# Patient Record
Sex: MALE | Race: BLACK OR AFRICAN AMERICAN | Employment: FULL TIME | URBAN - METROPOLITAN AREA
[De-identification: names, ages, dates, MRNs, and addresses within clinical notes are randomized per-mention and may not be internally consistent; named-entity substitution may affect disease eponyms.]

---

## 2018-03-20 ENCOUNTER — OFFICE VISIT (OUTPATIENT)
Dept: INTERNAL MEDICINE CLINIC | Age: 33
End: 2018-03-20

## 2018-03-20 VITALS
OXYGEN SATURATION: 99 % | TEMPERATURE: 99 F | DIASTOLIC BLOOD PRESSURE: 88 MMHG | BODY MASS INDEX: 32.73 KG/M2 | SYSTOLIC BLOOD PRESSURE: 136 MMHG | HEIGHT: 75 IN | HEART RATE: 89 BPM | WEIGHT: 263.2 LBS

## 2018-03-20 DIAGNOSIS — I10 ESSENTIAL HYPERTENSION: Primary | ICD-10-CM

## 2018-03-20 DIAGNOSIS — Z09 HOSPITAL DISCHARGE FOLLOW-UP: ICD-10-CM

## 2018-03-20 PROCEDURE — G8427 DOCREV CUR MEDS BY ELIG CLIN: HCPCS | Performed by: NURSE PRACTITIONER

## 2018-03-20 PROCEDURE — 99203 OFFICE O/P NEW LOW 30 MIN: CPT | Performed by: NURSE PRACTITIONER

## 2018-03-20 PROCEDURE — 4004F PT TOBACCO SCREEN RCVD TLK: CPT | Performed by: NURSE PRACTITIONER

## 2018-03-20 PROCEDURE — G8417 CALC BMI ABV UP PARAM F/U: HCPCS | Performed by: NURSE PRACTITIONER

## 2018-03-20 PROCEDURE — G8484 FLU IMMUNIZE NO ADMIN: HCPCS | Performed by: NURSE PRACTITIONER

## 2018-03-20 ASSESSMENT — ENCOUNTER SYMPTOMS: SHORTNESS OF BREATH: 0

## 2018-10-29 ENCOUNTER — HOSPITAL ENCOUNTER (EMERGENCY)
Age: 33
Discharge: HOME | End: 2018-10-29
Payer: MEDICAID

## 2018-10-29 VITALS
RESPIRATION RATE: 16 BRPM | WEIGHT: 260.8 LBS | HEIGHT: 74 IN | SYSTOLIC BLOOD PRESSURE: 145 MMHG | HEART RATE: 85 BPM | BODY MASS INDEX: 33.47 KG/M2 | OXYGEN SATURATION: 100 % | DIASTOLIC BLOOD PRESSURE: 96 MMHG | TEMPERATURE: 98.7 F

## 2018-10-29 DIAGNOSIS — R36.9 PENILE DISCHARGE: ICD-10-CM

## 2018-10-29 DIAGNOSIS — R03.0 ELEVATED BLOOD PRESSURE READING: ICD-10-CM

## 2018-10-29 DIAGNOSIS — R30.0 DYSURIA: Primary | ICD-10-CM

## 2018-10-29 DIAGNOSIS — Z11.3 SCREENING EXAMINATION FOR SEXUALLY TRANSMITTED DISEASE: ICD-10-CM

## 2018-10-29 LAB
BILIRUBIN URINE: NEGATIVE
BLOOD, URINE: ABNORMAL
CLARITY: ABNORMAL
COLOR: ABNORMAL
EPITHELIAL CELLS, UA: 0 /HPF (ref 0–5)
GLUCOSE URINE: NEGATIVE MG/DL
HYALINE CASTS: 1 /LPF (ref 0–8)
KETONES, URINE: NEGATIVE MG/DL
LEUKOCYTE ESTERASE, URINE: ABNORMAL
MICROSCOPIC EXAMINATION: YES
NITRITE, URINE: NEGATIVE
PH UA: 5.5
PROTEIN UA: 30 MG/DL
RBC UA: 48 /HPF (ref 0–4)
SPECIFIC GRAVITY UA: 1.02
URINE REFLEX TO CULTURE: YES
URINE TRICHOMONAS EVALUATION: NORMAL
URINE TYPE: ABNORMAL
UROBILINOGEN, URINE: 1 E.U./DL
WBC UA: 460 /HPF (ref 0–5)

## 2018-10-29 PROCEDURE — 87491 CHLMYD TRACH DNA AMP PROBE: CPT

## 2018-10-29 PROCEDURE — 87591 N.GONORRHOEAE DNA AMP PROB: CPT

## 2018-10-29 PROCEDURE — 96372 THER/PROPH/DIAG INJ SC/IM: CPT

## 2018-10-29 PROCEDURE — 99283 EMERGENCY DEPT VISIT LOW MDM: CPT

## 2018-10-29 PROCEDURE — 87086 URINE CULTURE/COLONY COUNT: CPT

## 2018-10-29 PROCEDURE — 81001 URINALYSIS AUTO W/SCOPE: CPT

## 2018-10-29 PROCEDURE — 6370000000 HC RX 637 (ALT 250 FOR IP): Performed by: PHYSICIAN ASSISTANT

## 2018-10-29 PROCEDURE — 6360000002 HC RX W HCPCS: Performed by: PHYSICIAN ASSISTANT

## 2018-10-29 RX ORDER — CEFTRIAXONE SODIUM 250 MG/1
250 INJECTION, POWDER, FOR SOLUTION INTRAMUSCULAR; INTRAVENOUS ONCE
Status: COMPLETED | OUTPATIENT
Start: 2018-10-29 | End: 2018-10-29

## 2018-10-29 RX ORDER — AZITHROMYCIN 500 MG/1
1000 TABLET, FILM COATED ORAL ONCE
Status: COMPLETED | OUTPATIENT
Start: 2018-10-29 | End: 2018-10-29

## 2018-10-29 RX ADMIN — AZITHROMYCIN 1000 MG: 500 TABLET, FILM COATED ORAL at 11:10

## 2018-10-29 RX ADMIN — CEFTRIAXONE SODIUM 250 MG: 250 INJECTION, POWDER, FOR SOLUTION INTRAMUSCULAR; INTRAVENOUS at 11:11

## 2018-10-29 ASSESSMENT — PAIN SCALES - GENERAL
PAINLEVEL_OUTOF10: 0
PAINLEVEL_OUTOF10: 0

## 2018-10-29 ASSESSMENT — PAIN SCALES - WONG BAKER: WONGBAKER_NUMERICALRESPONSE: 0

## 2018-10-30 LAB — URINE CULTURE, ROUTINE: NORMAL

## 2018-10-30 ASSESSMENT — ENCOUNTER SYMPTOMS
VOMITING: 0
NAUSEA: 0
ABDOMINAL PAIN: 0

## 2018-10-30 NOTE — ED PROVIDER NOTES
[facility]  eMERGENCY dEPARTMENT eNCOUnter      Evaluation by Advanced Practice Provider    Pt Name: Vince Huang  MRN: [id number]  [place] 1985  Date of evaluation: 10/29/2018  Provider: Terrell Rausch, [address] [place] [address]       Chief Complaint   Patient presents with    Dysuria     burning with urination and penile discharge- concerned for STD    Exposure to STD         HISTORY OF PRESENT ILLNESS  (Location/Symptom, Timing/Onset, Context/Setting, Quality, Duration,Modifying Factors, Severity.)   Vince Huang is a 29 y.o. male who presents to the emergencydepartment For dysuria and penile discharge since Thursday. He is concerned about STDs. No known exposure. Denies testicular pain. Denies sores lesions or rash. Denies fever chills nausea vomiting abdominal pain. Nursing Notes were reviewed and I agree. REVIEW OF SYSTEMS    (2-9 systems for level 4, 10 or more for level 5)     Review of Systems   Constitutional: Negative for chills and fever. Gastrointestinal: Negative for abdominal pain, nausea and vomiting. Genitourinary: Positive for discharge and dysuria. Negative for testicular pain. Skin: Negative for rash. Except as noted above the remainder of the review of systems was reviewed and negative. PAST MEDICAL HISTORY         Diagnosis Date    STD (male)        SURGICAL HISTORY   History reviewed. No pertinent surgical history. CURRENT MEDICATIONS       Discharge Medication List as of 10/29/2018 11:33 AM      CONTINUE these medications which have NOT CHANGED    Details   lisinopril (PRINIVIL) 10 MG tablet Take 1 tablet by mouth daily, Disp-30 tablet, R-3Print             ALLERGIES     Patient has no known allergies.     FAMILY HISTORY       Family History   Problem Relation Age of Onset    High Blood Pressure Mother     High Blood Pressure Father     High Blood Pressure Maternal Grandmother      Family Status   Relation

## 2018-10-31 LAB
C. TRACHOMATIS DNA ,URINE: NEGATIVE
N. GONORRHOEAE DNA, URINE: POSITIVE

## 2021-01-22 ENCOUNTER — HOSPITAL ENCOUNTER (EMERGENCY)
Age: 36
Discharge: HOME | End: 2021-01-22

## 2021-01-22 VITALS
DIASTOLIC BLOOD PRESSURE: 92 MMHG | TEMPERATURE: 98.5 F | BODY MASS INDEX: 33.1 KG/M2 | SYSTOLIC BLOOD PRESSURE: 156 MMHG | HEIGHT: 74 IN | RESPIRATION RATE: 16 BRPM | WEIGHT: 257.94 LBS | HEART RATE: 106 BPM | OXYGEN SATURATION: 97 %

## 2021-01-22 DIAGNOSIS — I10 POOR HIGH BLOOD PRESSURE CONTROL: ICD-10-CM

## 2021-01-22 DIAGNOSIS — N34.2 URETHRITIS: ICD-10-CM

## 2021-01-22 DIAGNOSIS — Z71.1 CONCERN ABOUT STD IN MALE WITHOUT DIAGNOSIS: Primary | ICD-10-CM

## 2021-01-22 LAB
BILIRUBIN URINE: NEGATIVE
BLOOD, URINE: NEGATIVE
CLARITY: ABNORMAL
COLOR: ABNORMAL
EPITHELIAL CELLS, UA: 0 /HPF (ref 0–5)
GLUCOSE URINE: 100 MG/DL
HYALINE CASTS: 2 /LPF (ref 0–8)
KETONES, URINE: ABNORMAL MG/DL
LEUKOCYTE ESTERASE, URINE: ABNORMAL
MICROSCOPIC EXAMINATION: YES
NITRITE, URINE: NEGATIVE
PH UA: 6 (ref 5–8)
PROTEIN UA: 30 MG/DL
RBC UA: 3 /HPF (ref 0–4)
SPECIFIC GRAVITY UA: 1.03 (ref 1–1.03)
SPECIMEN TYPE: NORMAL
TRICHOMONAS VAGINALIS SCREEN: NEGATIVE
URINE REFLEX TO CULTURE: YES
URINE TYPE: ABNORMAL
UROBILINOGEN, URINE: 1 E.U./DL
WBC UA: 366 /HPF (ref 0–5)

## 2021-01-22 PROCEDURE — 87086 URINE CULTURE/COLONY COUNT: CPT

## 2021-01-22 PROCEDURE — 96372 THER/PROPH/DIAG INJ SC/IM: CPT

## 2021-01-22 PROCEDURE — 6370000000 HC RX 637 (ALT 250 FOR IP): Performed by: GENERAL ACUTE CARE HOSPITAL

## 2021-01-22 PROCEDURE — 99283 EMERGENCY DEPT VISIT LOW MDM: CPT

## 2021-01-22 PROCEDURE — 87808 TRICHOMONAS ASSAY W/OPTIC: CPT

## 2021-01-22 PROCEDURE — 81001 URINALYSIS AUTO W/SCOPE: CPT

## 2021-01-22 PROCEDURE — 6360000002 HC RX W HCPCS: Performed by: GENERAL ACUTE CARE HOSPITAL

## 2021-01-22 RX ORDER — DOXYCYCLINE HYCLATE 100 MG
100 TABLET ORAL ONCE
Status: COMPLETED | OUTPATIENT
Start: 2021-01-22 | End: 2021-01-22

## 2021-01-22 RX ORDER — DOXYCYCLINE 100 MG/1
100 CAPSULE ORAL 2 TIMES DAILY
Qty: 14 CAPSULE | Refills: 0 | Status: SHIPPED | OUTPATIENT
Start: 2021-01-22 | End: 2021-01-29

## 2021-01-22 RX ORDER — CEFTRIAXONE 1 G/1
500 INJECTION, POWDER, FOR SOLUTION INTRAMUSCULAR; INTRAVENOUS ONCE
Status: COMPLETED | OUTPATIENT
Start: 2021-01-22 | End: 2021-01-22

## 2021-01-22 RX ADMIN — CEFTRIAXONE 500 MG: 1 INJECTION, POWDER, FOR SOLUTION INTRAMUSCULAR; INTRAVENOUS at 10:40

## 2021-01-22 RX ADMIN — DOXYCYCLINE HYCLATE 100 MG: 100 TABLET, COATED ORAL at 10:40

## 2021-01-22 ASSESSMENT — ENCOUNTER SYMPTOMS
BACK PAIN: 0
CHEST TIGHTNESS: 0
VOMITING: 0
ABDOMINAL PAIN: 0
SORE THROAT: 0
SHORTNESS OF BREATH: 0
NAUSEA: 0
ALLERGIC/IMMUNOLOGIC NEGATIVE: 1

## 2021-01-22 NOTE — ED NOTES
Pt here with CC of being exposed to a \"UTI\" and needing [address] for an STD. Urine collected and sent to lab for analysis.        Vishal Agrawal RN  01/22/21 101

## 2021-01-22 NOTE — ED PROVIDER NOTES
[address] [facility]        Pt Name: Param Yanez  MRN: [id number]  [place] 1985  Date of evaluation: 1/22/2021  Provider: MARIBEL Flores CNP  PCP: No primary care provider on file. LISET. I have evaluated this patient. My supervising physician was available for consultation. CHIEF COMPLAINT       Chief Complaint   Patient presents with    Exposure to STD     Patient states he has had tingling and penile discharge. Unsure if he has been exposed to STD       HISTORY OF PRESENT ILLNESS   (Location, Timing/Onset, Context/Setting, Quality, Duration, Modifying Factors, Severity, Associated Signs and Symptoms)  Note limiting factors. Param Yanez is a 37 y.o. male nursing notes reviewed. This is a 82-year-old male who presents to the emergency department today requesting STD check. He reports approximate 1 week history of dysuria and penile discharge. He denies hematuria. He states that he has had STDs in the past and today symptoms feel similar. He denies having any genital rash or lesions. He denies having any abdominal pain or back pain. There is been no vomiting. He denies recent travel or known sick contacts. There has been no fever, chills, or other symptoms. Nursing Notes were all reviewed and agreed with or any disagreements were addressed in the HPI. REVIEW OF SYSTEMS    (2-9 systems for level 4, 10 or more for level 5)     Review of Systems   Constitutional: Negative for chills and fever. HENT: Negative for congestion and sore throat. Respiratory: Negative for chest tightness and shortness of breath. Gastrointestinal: Negative for abdominal pain, nausea and vomiting. Genitourinary: Positive for discharge and dysuria. Negative for hematuria, penile swelling, scrotal swelling and testicular pain. Musculoskeletal: Negative for back pain and neck pain. Skin: Negative for rash. Allergic/Immunologic: Negative. Neurological: Negative. Hematological: Negative. Psychiatric/Behavioral: Negative for suicidal ideas. Positives and Pertinent negatives as per HPI. Except as noted above in the ROS, all other systems were reviewed and negative. PAST MEDICAL HISTORY     Past Medical History:   Diagnosis Date    STD (male)          SURGICAL HISTORY   History reviewed. No pertinent surgical history. [address]       Discharge Medication List as of 1/22/2021 11:11 AM      CONTINUE these medications which have NOT CHANGED    Details   lisinopril (PRINIVIL) 10 MG tablet Take 1 tablet by mouth daily, Disp-30 tablet, R-3Print               ALLERGIES     Patient has no known allergies. FAMILYHISTORY       Family History   Problem Relation Age of Onset    High Blood Pressure Mother     High Blood Pressure Father     High Blood Pressure Maternal Grandmother           SOCIAL HISTORY       Social History     Tobacco Use    Smoking status: Current Some Day Smoker     Packs/day: 0.10     Types: Cigarettes    Smokeless tobacco: Never Used   Substance Use Topics    Alcohol use: Yes     Alcohol/week: 105.0 standard drinks     Types: 105 Shots of liquor per week    Drug use: No       SCREENINGS             PHYSICAL EXAM    (up to 7 for level 4, 8 or more for level 5)     ED Triage Vitals [01/22/21 0812]   BP Temp Temp Source Pulse Resp SpO2 Height Weight   (!) 156/92 98.5 °F (36.9 °C) Oral 106 16 97 % 6' 2\" (1.88 m) 257 lb 15 oz (117 kg)       Physical Exam  Vitals signs and nursing note reviewed. Constitutional:       Appearance: Normal appearance. HENT:      Head: Normocephalic and atraumatic. Right Ear: External ear normal.      Left Ear: External ear normal.      Nose: Nose normal.      Mouth/Throat:      Mouth: Mucous membranes are moist.   Eyes:      General:         Right eye: No discharge. Left eye: No discharge.       Extraocular Movements: Extraocular movements intact. Neck:      Musculoskeletal: Normal range of motion and neck supple. Cardiovascular:      Rate and Rhythm: Normal rate and regular rhythm. Pulses: Normal pulses. Heart sounds: Normal heart sounds. Pulmonary:      Effort: Pulmonary effort is normal. No respiratory distress. Breath sounds: Normal breath sounds. Abdominal:      Palpations: Abdomen is soft. Genitourinary:     Comments: Deferred, patient denies having any genital lesions  Skin:     General: Skin is warm and dry. Capillary Refill: Capillary refill takes less than 2 seconds. Neurological:      General: No focal deficit present. Mental Status: He is alert and oriented to person, place, and time. Psychiatric:         Mood and Affect: Mood normal.         Behavior: Behavior normal.         DIAGNOSTIC RESULTS   LABS:    Labs Reviewed   URINE RT REFLEX TO CULTURE - Abnormal; Notable for the following components:       Result Value    Clarity, UA CLOUDY (*)     Glucose, Ur 100 (*)     Ketones, Urine TRACE (*)     Protein, UA 30 (*)     Leukocyte Esterase, Urine MODERATE (*)     All other components within normal limits    Narrative:     Performed at:  [facility]  [address] [place], [place]   Phone [phone]   MICROSCOPIC URINALYSIS - Abnormal; Notable for the following components:    WBC,  (*)     All other components within normal limits    Narrative:     Performed at:  [facility]  [address] [facility] [place] [address]   Phone [phone]   C. TRACHOMATIS / N. GONORRHOEAE, DNA   CULTURE, URINE   TRICHOMONAS BY EIA    Narrative:     Performed at:  [facility]  [address] [facility] [address]   Phone [phone]       All other labs were within normal range or not returned as of this dictation. EKG:  All EKG's are interpreted by the Emergency Department Physician in the absence of a cardiologist.  Please see their note for interpretation of EKG. RADIOLOGY:   Non-plain film images such as CT, Ultrasound and MRI are read by the radiologist. Plain radiographic images are visualized and preliminarily interpreted by the ED Provider with the below findings:        Interpretation per the Radiologist below, if available at the time of this note:    No orders to display     No results found. PROCEDURES   Unless otherwise noted below, none     Procedures    CRITICAL CARE TIME   N/A    CONSULTS:  None      EMERGENCY DEPARTMENT COURSE and DIFFERENTIAL DIAGNOSIS/MDM:   Vitals:    Vitals:    01/22/21 0812   BP: (!) 156/92   Pulse: 106   Resp: 16   Temp: 98.5 °F (36.9 °C)   TempSrc: Oral   SpO2: 97%   Weight: 257 lb 15 oz (117 kg)   Height: 6' 2\" (1.88 m)       Patient was given the following medications:  Medications   cefTRIAXone (ROCEPHIN) injection 500 mg (500 mg Intramuscular Given 1/22/21 1040)   doxycycline hyclate (VIBRA-TABS) tablet 100 mg (100 mg Oral Given 1/22/21 1040)           Nursing notes reviewed. This is a 68-year-old -American male who presents to the emergency department today reporting concerns for STD. He reports 1 week history of penile discharge and dysuria. Physical exam complete. Patient is nontoxic, afebrile, mildly hypertensive. Patient denies known history of hypertension. He is counseled on the importance of good blood pressure control. Urinalysis shows large leukocytes. No trichomonas. GC chlamydia testing pending. Patient does request empiric STD treatment. He is medicated with IM Rocephin 500 mg and oral doxycycline. Patient is counseled on the importance of completing the entire dose of his antibiotics. He is aware that he must notify any sexual partners so that they may also be treated. PCP referral provided. He agrees to follow-up as directed.   He agrees return to nearest ED for high fever, incessant vomiting, severe pain, any other worsening symptoms. FINAL IMPRESSION      1. Concern about STD in male without diagnosis    2. Urethritis    3.  Poor high blood pressure control          DISPOSITION/PLAN   DISPOSITION Decision To Discharge 01/22/2021 08:55:50 AM      PATIENT REFERREDTO:  [facility]  [phone]    to establish primary care      DISCHARGE MEDICATIONS:  Discharge Medication List as of 1/22/2021 11:11 AM      START taking these medications    Details   doxycycline monohydrate (MONODOX) 100 MG capsule Take 1 capsule by mouth 2 times daily for 7 days May substitute any form of Doxycycline according to insurance guidelines or minimal cost to patient, Disp-14 capsule, R-0Normal             DISCONTINUED MEDICATIONS:  Discharge Medication List as of 1/22/2021 11:11 AM                 (Please note that portions of this note were completed with a voice recognition program.  Efforts were made to edit the dictations but occasionally words are mis-transcribed.)    MARIBEL Brooks CNP (electronically signed)           MARIBEL Brooks CNP  01/22/21 7779

## 2021-01-22 NOTE — ED NOTES
Patient states he has had tingling and penile discharge.  Unsure if he has been exposed to STD     Delphine Walker RN  01/22/21 0658

## 2021-01-22 NOTE — DISCHARGE INSTR - COC
Continuity of Care Form    Patient Name: Tiffanie Diamond   :  1985  MRN:  [id number]    Admit date:  2021  Discharge date:  ***    Code Status Order: No Order   Advance Directives:     Admitting Physician:  No admitting provider for patient encounter. PCP: No primary care provider on file. Discharging Nurse: [facility] Unit/Room#: 031/B-31  Discharging Unit Phone Number: ***    Emergency Contact:   Extended Emergency Contact Information  Primary Emergency Contact: Umm Guzman  Address: [address] [place] [address], [address] [place] [address],[address] Phone: [phone]  Relation: Parent  Secondary Emergency Contact: Colby Tolliver  Address: [address] Phone: [phone]  Relation: Parent    Past Surgical History:  History reviewed. No pertinent surgical history. Immunization History: There is no immunization history on file for this patient. Active Problems: There is no problem list on file for this patient.       Isolation/Infection:   Isolation          No Isolation        Patient Infection Status     None to display          Nurse Assessment:  Last Vital Signs: BP (!) 156/92   Pulse 106   Temp 98.5 °F (36.9 °C) (Oral)   Resp 16   Ht 6' 2\" (1.88 m)   Wt 257 lb 15 oz (117 kg)   SpO2 97%   BMI 33.12 kg/m²     Last documented pain score (0-10 scale):    Last Weight:   Wt Readings from Last 1 Encounters:   21 257 lb 15 oz (117 kg)     Mental Status:  {IP PT MENTAL STATUS:}    IV Access:  { MACEY IV ACCESS:519629068}    Nursing Mobility/ADLs:  Walking   {CHP DME PIBM:347291980}  Transfer  {CHP DME ELHK:133762641}  Bathing  {CHP DME GFKN:796813732}  Dressing  {CHP DME REHA:169864275}  Toileting  {CHP DME QKZL:557984438}  Feeding  {CHP DME YUXZ:721195047}  Med Admin  {P DME CHAA:063922122}  Med Delivery   { MACEY MED Delivery:813431763}    Wound Care Documentation and Therapy: Elimination:  Continence:   · Bowel: {YES / EJ:86124}  · Bladder: {YES / MX:53531}  Urinary Catheter: {Urinary Catheter:648147293}   Colostomy/Ileostomy/Ileal Conduit: {YES / WT:28076}       Date of Last BM: ***  No intake or output data in the 24 hours ending 21 1019  No intake/output data recorded.     Safety Concerns:     [facility] Safety Concerns:844238479}    Impairments/Disabilities:      [facility] Impairments/Disabilities:684217439}    Nutrition Therapy:  Current Nutrition Therapy:   [facility] Diet List:573197202}    Routes of Feeding: {CHP DME Other Feedings:984469620}  Liquids: {Slp liquid thickness:91066}  Daily Fluid Restriction: {CHP DME Yes amt example:388594011}  Last Modified Barium Swallow with Video (Video Swallowing Test): {Done Not Done HWIT:898012001}    Treatments at the Time of Hospital Discharge:   Respiratory Treatments: ***  Oxygen Therapy:  {Therapy; copd oxygen:80798}  Ventilator:    { CC Vent YRPX:252815058}    Rehab Therapies: {THERAPEUTIC INTERVENTION:9483905812}  Weight Bearing Status/Restrictions: [facility] Weight Bearin}  Other Medical Equipment (for information only, NOT a DME order):  {EQUIPMENT:464474698}  Other Treatments: ***    Patient's personal belongings (please select all that are sent with patient):  {[facility] DME Belongings:507372222}    RN SIGNATURE:  {Esignature:375200730}    CASE MANAGEMENT/SOCIAL WORK SECTION    Inpatient Status Date: ***    Readmission Risk Assessment Score:  Readmission Risk              Risk of Unplanned Readmission:        0           Discharging to Facility/ Agency   · Name:   · Address:  · Phone:  · Fax:    Dialysis Facility (if applicable)   · Name:  · Address:  · Dialysis Schedule:  · Phone:  · Fax:    / signature: {Esignature:678193782}    PHYSICIAN SECTION    Prognosis: {Prognosis:9164657179}    Condition at Discharge: [facility] Patient Condition:280550671}    Rehab Potential (if transferring to Rehab): {Prognosis:8028439056}    Recommended Labs or Other Treatments After Discharge: ***    Physician Certification: I certify the above information and transfer of Kelby Moody  is necessary for the continuing treatment of the diagnosis listed and that he requires {Admit to Appropriate Level of Care:41205} for {GREATER/LESS:969922132} 30 days.      Update Admission H&P: {CHP DME Changes in TQNNB:397755562}    PHYSICIAN SIGNATURE:  {Esignature:134121077}

## 2021-01-23 LAB — URINE CULTURE, ROUTINE: NORMAL

## 2021-09-26 ENCOUNTER — HOSPITAL ENCOUNTER (EMERGENCY)
Age: 36
Discharge: HOME | End: 2021-09-26
Attending: EMERGENCY MEDICINE
Payer: MEDICAID

## 2021-09-26 VITALS
WEIGHT: 252.43 LBS | OXYGEN SATURATION: 100 % | HEIGHT: 74 IN | SYSTOLIC BLOOD PRESSURE: 149 MMHG | HEART RATE: 97 BPM | TEMPERATURE: 98.2 F | BODY MASS INDEX: 32.4 KG/M2 | RESPIRATION RATE: 16 BRPM | DIASTOLIC BLOOD PRESSURE: 93 MMHG

## 2021-09-26 DIAGNOSIS — S39.012A STRAIN OF LUMBAR REGION, INITIAL ENCOUNTER: Primary | ICD-10-CM

## 2021-09-26 PROCEDURE — 96372 THER/PROPH/DIAG INJ SC/IM: CPT

## 2021-09-26 PROCEDURE — 6360000002 HC RX W HCPCS: Performed by: EMERGENCY MEDICINE

## 2021-09-26 PROCEDURE — 99283 EMERGENCY DEPT VISIT LOW MDM: CPT

## 2021-09-26 PROCEDURE — 6370000000 HC RX 637 (ALT 250 FOR IP): Performed by: EMERGENCY MEDICINE

## 2021-09-26 RX ORDER — LIDOCAINE 4 G/G
1 PATCH TOPICAL DAILY
Qty: 30 PATCH | Refills: 0 | Status: SHIPPED | OUTPATIENT
Start: 2021-09-26 | End: 2021-10-26

## 2021-09-26 RX ORDER — KETOROLAC TROMETHAMINE 15 MG/ML
15 INJECTION, SOLUTION INTRAMUSCULAR; INTRAVENOUS ONCE
Status: COMPLETED | OUTPATIENT
Start: 2021-09-26 | End: 2021-09-26

## 2021-09-26 RX ORDER — NAPROXEN 500 MG/1
500 TABLET ORAL 2 TIMES DAILY WITH MEALS
Qty: 60 TABLET | Refills: 5 | Status: SHIPPED | OUTPATIENT
Start: 2021-09-26

## 2021-09-26 RX ORDER — TIZANIDINE 4 MG/1
4 TABLET ORAL EVERY 6 HOURS PRN
Qty: 20 TABLET | Refills: 0 | Status: SHIPPED | OUTPATIENT
Start: 2021-09-26

## 2021-09-26 RX ORDER — LIDOCAINE 4 G/G
1 PATCH TOPICAL ONCE
Status: DISCONTINUED | OUTPATIENT
Start: 2021-09-26 | End: 2021-09-27 | Stop reason: HOSPADM

## 2021-09-26 RX ADMIN — KETOROLAC TROMETHAMINE 15 MG: 15 INJECTION, SOLUTION INTRAMUSCULAR; INTRAVENOUS at 22:47

## 2021-09-26 ASSESSMENT — ENCOUNTER SYMPTOMS
VOMITING: 0
SHORTNESS OF BREATH: 0
PHOTOPHOBIA: 0
COLOR CHANGE: 0
WHEEZING: 0
BACK PAIN: 0
RHINORRHEA: 0
NAUSEA: 0

## 2021-09-26 ASSESSMENT — PAIN DESCRIPTION - PAIN TYPE: TYPE: ACUTE PAIN

## 2021-09-26 ASSESSMENT — PAIN DESCRIPTION - FREQUENCY: FREQUENCY: OTHER (COMMENT)

## 2021-09-26 ASSESSMENT — PAIN SCALES - GENERAL: PAINLEVEL_OUTOF10: 6

## 2021-09-26 ASSESSMENT — PAIN DESCRIPTION - LOCATION: LOCATION: BACK

## 2021-09-26 ASSESSMENT — PAIN DESCRIPTION - DESCRIPTORS: DESCRIPTORS: DISCOMFORT

## 2021-09-27 NOTE — ED NOTES
D/C: Order noted for d/c. Pt confirmed d/c paperwork and RX have correct name. Discharge and education instructions reviewed with patient. Teach-back successful. Pt verbalized understanding and signed d/c papers. Pt denied questions at this time. No acute distress noted. Patient instructed to follow-up as noted - return to emergency department if symptoms worsen. Patient verbalized understanding. Discharged per EDMD with discharge instructions. Pt discharged to private vehicle. Patient stable upon departure. Thanked patient for choosing [facility] for care. Provider aware of patient pain at time of discharge.      Tawanda Godinezr, RN  09/26/21 6003
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
## 2024-11-07 ENCOUNTER — APPOINTMENT (OUTPATIENT)
Dept: GENERAL RADIOLOGY | Age: 39
End: 2024-11-07

## 2024-11-07 ENCOUNTER — HOSPITAL ENCOUNTER (EMERGENCY)
Age: 39
Discharge: HOME | End: 2024-11-07
Attending: STUDENT IN AN ORGANIZED HEALTH CARE EDUCATION/TRAINING PROGRAM

## 2024-11-07 VITALS
TEMPERATURE: 97.4 F | RESPIRATION RATE: 18 BRPM | OXYGEN SATURATION: 97 % | SYSTOLIC BLOOD PRESSURE: 164 MMHG | HEART RATE: 86 BPM | DIASTOLIC BLOOD PRESSURE: 92 MMHG | WEIGHT: 241.18 LBS | BODY MASS INDEX: 30.97 KG/M2

## 2024-11-07 DIAGNOSIS — S86.911A STRAIN OF RIGHT KNEE, INITIAL ENCOUNTER: Primary | ICD-10-CM

## 2024-11-07 PROCEDURE — 6360000002 HC RX W HCPCS: Performed by: STUDENT IN AN ORGANIZED HEALTH CARE EDUCATION/TRAINING PROGRAM

## 2024-11-07 PROCEDURE — 99284 EMERGENCY DEPT VISIT MOD MDM: CPT

## 2024-11-07 PROCEDURE — 6370000000 HC RX 637 (ALT 250 FOR IP): Performed by: STUDENT IN AN ORGANIZED HEALTH CARE EDUCATION/TRAINING PROGRAM

## 2024-11-07 PROCEDURE — 73560 X-RAY EXAM OF KNEE 1 OR 2: CPT

## 2024-11-07 RX ORDER — KETOROLAC TROMETHAMINE 30 MG/ML
30 INJECTION, SOLUTION INTRAMUSCULAR; INTRAVENOUS ONCE
Status: COMPLETED | OUTPATIENT
Start: 2024-11-07 | End: 2024-11-07

## 2024-11-07 RX ORDER — ACETAMINOPHEN 325 MG/1
650 TABLET ORAL ONCE
Status: COMPLETED | OUTPATIENT
Start: 2024-11-07 | End: 2024-11-07

## 2024-11-07 RX ADMIN — ACETAMINOPHEN 325MG 650 MG: 325 TABLET ORAL at 17:48

## 2024-11-07 RX ADMIN — KETOROLAC TROMETHAMINE 30 MG: 30 INJECTION, SOLUTION INTRAMUSCULAR at 17:49

## 2024-11-07 ASSESSMENT — PAIN DESCRIPTION - FREQUENCY: FREQUENCY: INTERMITTENT

## 2024-11-07 ASSESSMENT — PAIN DESCRIPTION - ORIENTATION
ORIENTATION: RIGHT
ORIENTATION: RIGHT

## 2024-11-07 ASSESSMENT — PAIN DESCRIPTION - LOCATION
LOCATION: KNEE
LOCATION: KNEE

## 2024-11-07 ASSESSMENT — PAIN SCALES - GENERAL
PAINLEVEL_OUTOF10: 8
PAINLEVEL_OUTOF10: 8

## 2024-11-07 ASSESSMENT — PAIN - FUNCTIONAL ASSESSMENT: PAIN_FUNCTIONAL_ASSESSMENT: 0-10

## 2024-11-07 ASSESSMENT — PAIN DESCRIPTION - DESCRIPTORS
DESCRIPTORS: THROBBING;TIGHTNESS
DESCRIPTORS: THROBBING

## 2024-11-07 NOTE — DISCHARGE INSTRUCTIONS
Take your medications as prescribed.   Follow-up with the referral given to you.  Make sure you  a knee brace as discussed.  You may take Tylenol and ibuprofen for pain control    Return if you develop any new or worsening symptoms as we discussed today.

## 2024-11-07 NOTE — ED TRIAGE NOTES
Pt to ED with c/c of R knee swelling and pain 8/10 when sitting x2 months. Pt stated he placed a brace on today. NKI.

## 2024-11-07 NOTE — ED PROVIDER NOTES
[facility] EMERGENCY DEPARTMENT      EMERGENCY MEDICINE     Pt Name: Mitch Guzman  MRN: [id number]  Birthdate 1985  Date of evaluation: 11/7/2024  Provider: Jayro Sarah MD    CHIEF COMPLAINT       Chief Complaint   Patient presents with    Knee Pain     Pt to ED with c/c of R knee swelling and pain 8/10 when sitting x2 months. Pt stated he placed a brace on today. NKI.      HISTORY OF PRESENT ILLNESS   Mitch Guzman is a 38 y.o. male who presents to the emergency department for right knee pain and swelling the last 2 months family has some time to come in to get evaluated.  He had no recent traumatic injuries falls fevers or chills        PASTMEDICAL HISTORY     Past Medical History:   Diagnosis Date    STD (male)        There is no problem list on file for this patient.    SURGICAL HISTORY     History reviewed. No pertinent surgical history.    CURRENT MEDICATIONS       Previous Medications    LISINOPRIL (PRINIVIL) 10 MG TABLET    Take 1 tablet by mouth daily    NAPROXEN (NAPROSYN) 500 MG TABLET    Take 1 tablet by mouth 2 times daily (with meals)    TIZANIDINE (ZANAFLEX) 4 MG TABLET    Take 1 tablet by mouth every 6 hours as needed (back pain)       ALLERGIES     has No Known Allergies.    FAMILY HISTORY     He indicated that his mother is alive. He indicated that his father is alive. He indicated that his maternal grandmother is alive.       SOCIAL HISTORY       Social History     Tobacco Use    Smoking status: Some Days     Current packs/day: 0.10     Types: Cigarettes    Smokeless tobacco: Never   Substance Use Topics    Alcohol use: Yes     Comment: occassionally    Drug use: No       PHYSICAL EXAM       ED Triage Vitals [11/07/24 1716]   BP Systolic BP Percentile Diastolic BP Percentile Temp Temp Source Pulse Respirations SpO2   (!) 164/92 -- -- 97.4 °F (36.3 °C) Oral 86 18 97 %      Height Weight - Scale         -- 109.4 kg (241 lb 2.9 oz)             Physical

## 2024-11-08 ENCOUNTER — TELEPHONE (OUTPATIENT)
Dept: ORTHOPEDIC SURGERY | Age: 39
End: 2024-11-08

## 2024-11-08 NOTE — ED NOTES
Patient DCed from ED at this time. Discussed AVS and follow up. They verbalized understanding. Reinforced that should symptoms persist or worsen to return to the ED. They verbalized understanding. Patient ambulated out of ED. RN thanked patient for choosing [facility].